# Patient Record
Sex: MALE | Race: BLACK OR AFRICAN AMERICAN | NOT HISPANIC OR LATINO | Employment: OTHER | URBAN - METROPOLITAN AREA
[De-identification: names, ages, dates, MRNs, and addresses within clinical notes are randomized per-mention and may not be internally consistent; named-entity substitution may affect disease eponyms.]

---

## 2019-12-02 ENCOUNTER — OFFICE VISIT (OUTPATIENT)
Dept: ALLERGY | Facility: CLINIC | Age: 59
End: 2019-12-02
Payer: MEDICARE

## 2019-12-02 VITALS — OXYGEN SATURATION: 98 % | WEIGHT: 180.75 LBS | HEIGHT: 62 IN | HEART RATE: 76 BPM | BODY MASS INDEX: 33.26 KG/M2

## 2019-12-02 DIAGNOSIS — J45.40 MODERATE PERSISTENT ASTHMA WITHOUT COMPLICATION: Primary | ICD-10-CM

## 2019-12-02 DIAGNOSIS — J31.0 CHRONIC RHINITIS: ICD-10-CM

## 2019-12-02 PROCEDURE — 99999 PR PBB SHADOW E&M-EST. PATIENT-LVL III: CPT | Mod: PBBFAC,,, | Performed by: ALLERGY & IMMUNOLOGY

## 2019-12-02 PROCEDURE — 99999 PR PBB SHADOW E&M-EST. PATIENT-LVL III: ICD-10-PCS | Mod: PBBFAC,,, | Performed by: ALLERGY & IMMUNOLOGY

## 2019-12-02 PROCEDURE — 99213 OFFICE O/P EST LOW 20 MIN: CPT | Mod: PBBFAC,PO | Performed by: ALLERGY & IMMUNOLOGY

## 2019-12-02 PROCEDURE — 99204 PR OFFICE/OUTPT VISIT, NEW, LEVL IV, 45-59 MIN: ICD-10-PCS | Mod: S$PBB,,, | Performed by: ALLERGY & IMMUNOLOGY

## 2019-12-02 PROCEDURE — 99204 OFFICE O/P NEW MOD 45 MIN: CPT | Mod: S$PBB,,, | Performed by: ALLERGY & IMMUNOLOGY

## 2019-12-02 RX ORDER — ALBUTEROL SULFATE 90 UG/1
2 AEROSOL, METERED RESPIRATORY (INHALATION) EVERY 6 HOURS PRN
COMMUNITY
End: 2019-12-02 | Stop reason: SDUPTHER

## 2019-12-02 RX ORDER — PREDNISONE 20 MG/1
20 TABLET ORAL DAILY
Status: ON HOLD | COMMUNITY
End: 2020-11-25 | Stop reason: HOSPADM

## 2019-12-02 RX ORDER — FUROSEMIDE 80 MG/1
80 TABLET ORAL DAILY PRN
COMMUNITY

## 2019-12-02 RX ORDER — BUDESONIDE AND FORMOTEROL FUMARATE DIHYDRATE 160; 4.5 UG/1; UG/1
2 AEROSOL RESPIRATORY (INHALATION) EVERY 12 HOURS
Qty: 3 INHALER | Refills: 4 | Status: SHIPPED | OUTPATIENT
Start: 2019-12-02 | End: 2020-11-05 | Stop reason: CLARIF

## 2019-12-02 RX ORDER — BUDESONIDE AND FORMOTEROL FUMARATE DIHYDRATE 160; 4.5 UG/1; UG/1
2 AEROSOL RESPIRATORY (INHALATION) EVERY 12 HOURS
COMMUNITY
End: 2019-12-02 | Stop reason: SDUPTHER

## 2019-12-02 RX ORDER — GABAPENTIN 300 MG/1
300 CAPSULE ORAL 3 TIMES DAILY
COMMUNITY
End: 2024-03-26 | Stop reason: CLARIF

## 2019-12-02 RX ORDER — ALBUTEROL SULFATE 90 UG/1
2 AEROSOL, METERED RESPIRATORY (INHALATION) EVERY 6 HOURS PRN
Qty: 18 G | Refills: 3 | Status: SHIPPED | OUTPATIENT
Start: 2019-12-02 | End: 2021-01-12

## 2019-12-02 RX ORDER — FLUTICASONE PROPIONATE 50 MCG
2 SPRAY, SUSPENSION (ML) NASAL DAILY
Qty: 48 G | Refills: 4 | Status: SHIPPED | OUTPATIENT
Start: 2019-12-02 | End: 2020-12-14

## 2019-12-02 RX ORDER — AZELASTINE 1 MG/ML
2 SPRAY, METERED NASAL 2 TIMES DAILY
Qty: 90 ML | Refills: 4 | Status: SHIPPED | OUTPATIENT
Start: 2019-12-02 | End: 2020-12-14

## 2019-12-02 RX ORDER — ALLOPURINOL 300 MG/1
300 TABLET ORAL DAILY
COMMUNITY
End: 2024-03-26 | Stop reason: CLARIF

## 2019-12-02 RX ORDER — FLUTICASONE PROPIONATE 50 UG/1
POWDER, METERED RESPIRATORY (INHALATION)
COMMUNITY
End: 2019-12-02

## 2019-12-02 RX ORDER — AMLODIPINE BESYLATE 5 MG/1
10 TABLET ORAL DAILY
COMMUNITY

## 2019-12-02 NOTE — LETTER
December 2, 2019      Sushma Gupta, MD  [address] [facility]  [address]  [facility] [address]           [facility]  [address] [place] [address]  Phone: [phone]          Patient: Cecilio Skelton   MR Number: [id number]   YOB: 1960   Date of Visit: 12/2/2019       Dear Dr. Sushma Gupta:    Thank you for referring Cecilio Skelton to me for evaluation. Attached you will find relevant portions of my assessment and plan of care.    If you have questions, please do not hesitate to call me. I look forward to following Cecilio Skelton along with you.    Sincerely,    Leora Richard MD    Enclosure  CC:  No Recipients    If you would like to receive this communication electronically, please contact [email or web address] or [phone] to request more information on [facility] Link access.    For providers and/or their staff who would like to refer a patient to [facility], please contact us through our one-stop-shop provider referral line, [facility], at [phone].    If you feel you have received this communication in error or would no longer like to receive these types of communications, please e-mail [email or web address]

## 2019-12-02 NOTE — PROGRESS NOTES
Subjective:       Patient ID: Cecilio Skelton is a 59 y.o. male.    Chief Complaint:  Other (est care.former Dr Ramirez patient)      60 yo man presents for new patent evaluation of asthma. He has seen Dr Ramirez but establishing here since her residential. He has had asthma for years. He is on Symbicort 160 2 puffs BID. He uses ventolin usually once a day as  Habit and more if wheezing. Never more then 2-3 times per day. Ion last Y ear no bad flares, no ER or UC visits. He takes prednisone 20 mg daily for nephrotic syndrome. No increases in prednisone this last year because of asthma. He has some mucus he coughs up at times. He is on Flonase 2 SEN daily but nothing else. Rare SOB. occ nasal congestion and PND but not much sneeze or runny nose, had allergy test in 90's and told cockroach and maybe cats. Also told allergy to shellfish but never had reaction. It does flare his gout so avoids now. No eczema. No sinus surgery.       Environmental History: see history section for home environment  Review of Systems   Constitutional: Negative for activity change, appetite change, chills, fatigue, fever and unexpected weight change.   HENT: Positive for congestion and postnasal drip. Negative for ear discharge, ear pain, facial swelling, hearing loss, mouth sores, nosebleeds, rhinorrhea, sinus pressure, sneezing, sore throat, tinnitus, trouble swallowing and voice change.    Eyes: Negative for discharge, redness, itching and visual disturbance.   Respiratory: Positive for cough and wheezing. Negative for chest tightness and shortness of breath.    Cardiovascular: Negative for chest pain, palpitations and leg swelling.   Gastrointestinal: Negative for abdominal distention, abdominal pain, constipation, diarrhea, nausea and vomiting.   Genitourinary: Negative for difficulty urinating.   Musculoskeletal: Positive for arthralgias. Negative for back pain, joint swelling and myalgias.   Skin: Negative for color change, pallor and rash.    Neurological: Negative for dizziness, tremors, speech difficulty, weakness, light-headedness and headaches.   Hematological: Negative for adenopathy. Does not bruise/bleed easily.   Psychiatric/Behavioral: Negative for agitation, confusion, decreased concentration and sleep disturbance. The patient is not nervous/anxious.         Objective:      Physical Exam   Constitutional: He is oriented to person, place, and time. He appears well-developed and well-nourished. No distress.   HENT:   Head: Normocephalic and atraumatic.   Right Ear: Hearing, tympanic membrane, external ear and ear canal normal.   Left Ear: Hearing, tympanic membrane, external ear and ear canal normal.   Nose: No mucosal edema (pink turbinates), rhinorrhea, sinus tenderness or septal deviation. No epistaxis.   Mouth/Throat: Oropharynx is clear and moist and mucous membranes are normal. No uvula swelling.   Eyes: Conjunctivae are normal. Right eye exhibits no discharge. Left eye exhibits no discharge.   Neck: Normal range of motion. No thyromegaly present.   Cardiovascular: Normal rate, regular rhythm and normal heart sounds.   No murmur heard.  Pulmonary/Chest: Effort normal and breath sounds normal. No respiratory distress. He has no wheezes.   Abdominal: Soft. He exhibits no distension. There is no tenderness.   Musculoskeletal: Normal range of motion. He exhibits no edema.   Lymphadenopathy:     He has no cervical adenopathy.   Neurological: He is alert and oriented to person, place, and time. Coordination normal.   Skin: Skin is warm and dry. No rash noted. No erythema.   Psychiatric: He has a normal mood and affect. His behavior is normal. Judgment and thought content normal.   Nursing note and vitals reviewed.      Laboratory:   none performed   Assessment:       1. Moderate persistent asthma without complication    2. Chronic rhinitis         Plan:       1. Continue Symbicort 60 2 puffs BID  2. continue ventolin 2 puffs every 4 hours as  needed  3. continue fluticasone 2 SEN daily and add azelastine 2 SEN BID as needed for mucus  4. RTC annually or sooner if needed

## 2020-11-10 PROBLEM — C61 MALIGNANT NEOPLASM OF PROSTATE: Status: ACTIVE | Noted: 2020-11-10

## 2020-11-16 PROBLEM — I10 HYPERTENSION: Status: ACTIVE | Noted: 2020-11-16

## 2020-11-16 PROBLEM — J12.82 PNEUMONIA DUE TO COVID-19 VIRUS: Status: ACTIVE | Noted: 2020-11-16

## 2020-11-16 PROBLEM — J45.909 ASTHMA: Status: ACTIVE | Noted: 2020-11-16

## 2020-11-16 PROBLEM — A41.9 SEPSIS: Status: ACTIVE | Noted: 2020-11-16

## 2020-11-16 PROBLEM — M10.9 GOUT: Status: ACTIVE | Noted: 2020-11-16

## 2020-11-16 PROBLEM — U07.1 PNEUMONIA DUE TO COVID-19 VIRUS: Status: ACTIVE | Noted: 2020-11-16

## 2020-11-16 PROBLEM — J96.01 ACUTE RESPIRATORY FAILURE WITH HYPOXIA: Status: ACTIVE | Noted: 2020-11-16

## 2020-12-08 PROBLEM — N04.9 NEPHROTIC SYNDROME: Status: ACTIVE | Noted: 2020-12-08

## 2021-01-12 RX ORDER — ALBUTEROL SULFATE 90 UG/1
AEROSOL, METERED RESPIRATORY (INHALATION)
Qty: 18 G | Refills: 0 | Status: SHIPPED | OUTPATIENT
Start: 2021-01-12

## 2021-01-12 RX ORDER — AZELASTINE 1 MG/ML
SPRAY, METERED NASAL
Qty: 30 ML | Refills: 0 | Status: SHIPPED | OUTPATIENT
Start: 2021-01-12

## 2021-01-12 RX ORDER — FLUTICASONE PROPIONATE 50 MCG
SPRAY, SUSPENSION (ML) NASAL
Qty: 16 G | Refills: 0 | Status: SHIPPED | OUTPATIENT
Start: 2021-01-12

## 2021-03-08 RX ORDER — ALBUTEROL SULFATE 90 UG/1
AEROSOL, METERED RESPIRATORY (INHALATION)
Qty: 18 G | Refills: 0 | OUTPATIENT
Start: 2021-03-08

## 2021-03-08 RX ORDER — FLUTICASONE PROPIONATE 50 MCG
SPRAY, SUSPENSION (ML) NASAL
Qty: 16 G | Refills: 0 | OUTPATIENT
Start: 2021-03-08

## 2021-03-08 RX ORDER — AZELASTINE 1 MG/ML
SPRAY, METERED NASAL
Qty: 30 ML | Refills: 0 | OUTPATIENT
Start: 2021-03-08

## 2022-02-01 PROBLEM — S66.196D: Status: ACTIVE | Noted: 2022-02-01

## 2022-03-23 PROBLEM — S66.196D: Status: RESOLVED | Noted: 2022-02-01 | Resolved: 2022-03-23

## 2022-06-21 PROBLEM — G56.01 CARPAL TUNNEL SYNDROME ON RIGHT: Status: ACTIVE | Noted: 2022-06-21

## 2022-06-21 PROBLEM — G56.21 LESION OF RIGHT ULNAR NERVE: Status: ACTIVE | Noted: 2022-06-21

## 2022-06-21 PROBLEM — S56.127D: Status: ACTIVE | Noted: 2022-06-21

## 2022-06-21 PROBLEM — S56.125S: Status: ACTIVE | Noted: 2022-06-21

## 2023-02-08 ENCOUNTER — TELEPHONE (OUTPATIENT)
Dept: PAIN MEDICINE | Facility: CLINIC | Age: 63
End: 2023-02-08
Payer: MEDICARE

## 2023-02-08 NOTE — TELEPHONE ENCOUNTER
Offered pt Dr Renae 03/16 next available, he declined offered Dr Saab next available 03/02 pt didn't want to take that either but I told him that was the soonest advised we are interventional pain. Scheduled for 03/02

## 2023-02-08 NOTE — TELEPHONE ENCOUNTER
----- Message from Gin Davison sent at 2/8/2023 12:49 PM CST -----  Type:  Appointment Request    Name of Caller:Patient   When is the first available appointment?No access  Would the patient rather a call back or a response via [facility]? Call back   Best Call Back Number:[phone]  Additional Information: Patient indicates he would like to schedule an appointment with . patient indicates he is experiencing pains in his waist,thigh,groin and leg. Patient would like to schedule an appointment with  if possible. Patient would like a call back regarding mor information.

## 2023-02-13 PROBLEM — G56.21 LESION OF RIGHT ULNAR NERVE: Status: RESOLVED | Noted: 2022-06-21 | Resolved: 2023-02-13

## 2023-02-13 PROBLEM — S56.125S: Status: RESOLVED | Noted: 2022-06-21 | Resolved: 2023-02-13

## 2023-02-13 PROBLEM — G56.01 CARPAL TUNNEL SYNDROME ON RIGHT: Status: RESOLVED | Noted: 2022-06-21 | Resolved: 2023-02-13

## 2023-02-13 PROBLEM — S56.127D: Status: RESOLVED | Noted: 2022-06-21 | Resolved: 2023-02-13

## 2023-03-01 PROBLEM — M25.60 RANGE OF MOTION DEFICIT: Status: ACTIVE | Noted: 2023-03-01

## 2023-08-24 PROBLEM — M25.60 RANGE OF MOTION DEFICIT: Status: RESOLVED | Noted: 2023-03-01 | Resolved: 2023-08-24

## 2023-08-24 PROBLEM — G56.01 CARPAL TUNNEL SYNDROME ON RIGHT: Status: RESOLVED | Noted: 2022-06-21 | Resolved: 2023-08-24

## 2023-08-24 PROBLEM — S56.125S: Status: RESOLVED | Noted: 2022-06-21 | Resolved: 2023-08-24

## 2023-08-24 PROBLEM — S66.196D: Status: RESOLVED | Noted: 2022-02-01 | Resolved: 2023-08-24

## 2023-08-24 PROBLEM — G56.21 LESION OF RIGHT ULNAR NERVE: Status: RESOLVED | Noted: 2022-06-21 | Resolved: 2023-08-24

## 2024-02-01 DIAGNOSIS — R19.00 PALPABLE ABDOMINAL MASS: Primary | ICD-10-CM

## 2024-02-08 ENCOUNTER — HOSPITAL ENCOUNTER (OUTPATIENT)
Dept: RADIOLOGY | Facility: HOSPITAL | Age: 64
Discharge: HOME | End: 2024-02-08
Attending: NURSE PRACTITIONER
Payer: MEDICARE

## 2024-02-08 DIAGNOSIS — R19.00 PALPABLE ABDOMINAL MASS: ICD-10-CM

## 2024-02-08 PROCEDURE — 76705 ECHO EXAM OF ABDOMEN: CPT | Mod: TC,PO

## 2024-02-08 PROCEDURE — 76705 ECHO EXAM OF ABDOMEN: CPT | Mod: 26,,, | Performed by: RADIOLOGY

## 2024-03-29 PROBLEM — R19.00 ABDOMINAL MASS: Status: RESOLVED | Noted: 2024-03-29 | Resolved: 2024-03-29

## 2024-03-29 PROBLEM — R19.00 ABDOMINAL MASS: Status: ACTIVE | Noted: 2024-03-29

## 2024-08-20 PROBLEM — M25.641 STIFFNESS OF RIGHT HAND, NOT ELSEWHERE CLASSIFIED: Status: ACTIVE | Noted: 2024-08-20

## 2024-08-20 PROBLEM — B35.1 TINEA UNGUIUM: Status: ACTIVE | Noted: 2024-08-20

## 2024-08-22 PROBLEM — M79.645 PAIN IN FINGER OF BOTH HANDS: Status: ACTIVE | Noted: 2024-08-22

## 2024-08-22 PROBLEM — M79.644 PAIN IN FINGER OF BOTH HANDS: Status: ACTIVE | Noted: 2024-08-22

## 2024-08-28 PROBLEM — M54.16 LUMBAR RADICULOPATHY: Status: ACTIVE | Noted: 2024-08-28

## 2024-08-28 PROBLEM — M96.1 POSTLAMINECTOMY SYNDROME, CERVICAL REGION: Status: ACTIVE | Noted: 2024-08-28

## 2024-08-28 PROBLEM — M47.899 OTHER OSTEOARTHRITIS OF SPINE: Status: ACTIVE | Noted: 2024-08-28

## 2024-12-11 PROBLEM — M79.645 PAIN IN FINGER OF BOTH HANDS: Status: RESOLVED | Noted: 2024-08-22 | Resolved: 2024-12-11

## 2024-12-11 PROBLEM — M25.641 STIFFNESS OF RIGHT HAND, NOT ELSEWHERE CLASSIFIED: Status: RESOLVED | Noted: 2024-08-20 | Resolved: 2024-12-11

## 2024-12-11 PROBLEM — M79.644 PAIN IN FINGER OF BOTH HANDS: Status: RESOLVED | Noted: 2024-08-22 | Resolved: 2024-12-11

## 2024-12-18 PROBLEM — I82.409 DVT (DEEP VENOUS THROMBOSIS): Status: ACTIVE | Noted: 2024-12-18

## 2024-12-18 PROBLEM — I26.99 ACUTE PULMONARY EMBOLISM: Status: ACTIVE | Noted: 2024-12-18

## 2024-12-18 PROBLEM — R65.10 SIRS (SYSTEMIC INFLAMMATORY RESPONSE SYNDROME): Status: ACTIVE | Noted: 2024-12-18

## 2024-12-18 PROBLEM — J11.1 INFLUENZA: Status: ACTIVE | Noted: 2024-12-18

## 2024-12-18 PROBLEM — Z71.89 ACP (ADVANCE CARE PLANNING): Status: ACTIVE | Noted: 2024-12-18

## 2025-01-12 PROBLEM — Z86.711 HISTORY OF PULMONARY EMBOLUS (PE): Status: ACTIVE | Noted: 2025-01-12

## 2025-01-12 PROBLEM — R73.03 PREDIABETES: Status: ACTIVE | Noted: 2025-01-12

## 2025-01-12 PROBLEM — D64.9 NORMOCYTIC ANEMIA: Status: ACTIVE | Noted: 2025-01-12

## 2025-01-12 PROBLEM — I73.9 PERIPHERAL ARTERY DISEASE: Status: ACTIVE | Noted: 2025-01-12

## 2025-01-14 ENCOUNTER — TELEPHONE (OUTPATIENT)
Dept: VASCULAR SURGERY | Facility: CLINIC | Age: 65
End: 2025-01-14
Payer: MEDICARE

## 2025-01-14 DIAGNOSIS — I73.9 PERIPHERAL ARTERY DISEASE: Primary | ICD-10-CM

## 2025-01-14 NOTE — TELEPHONE ENCOUNTER
Appt scheduled for 1/30    ----- Message from Amanda sent at 1/14/2025 11:20 AM CST -----   Type:  Needs Medical Advice    Who Called:  Elpidio/[facility]    Would the patient rather a call back or a response via [facility]? call  Best Call Back Number:  [phone]        Additional Information:  trying to a hosptial f/u appt  Please call back to advise. Thank you!

## 2025-01-30 ENCOUNTER — OFFICE VISIT (OUTPATIENT)
Dept: VASCULAR SURGERY | Facility: CLINIC | Age: 65
End: 2025-01-30
Payer: MEDICARE

## 2025-01-30 VITALS
WEIGHT: 182.75 LBS | SYSTOLIC BLOOD PRESSURE: 112 MMHG | BODY MASS INDEX: 33.63 KG/M2 | HEART RATE: 88 BPM | HEIGHT: 62 IN | DIASTOLIC BLOOD PRESSURE: 76 MMHG

## 2025-01-30 DIAGNOSIS — I73.9 PERIPHERAL ARTERY DISEASE: Primary | ICD-10-CM

## 2025-01-30 PROCEDURE — 3044F HG A1C LEVEL LT 7.0%: CPT | Mod: CPTII,S$GLB,, | Performed by: STUDENT IN AN ORGANIZED HEALTH CARE EDUCATION/TRAINING PROGRAM

## 2025-01-30 PROCEDURE — 1125F AMNT PAIN NOTED PAIN PRSNT: CPT | Mod: CPTII,S$GLB,, | Performed by: STUDENT IN AN ORGANIZED HEALTH CARE EDUCATION/TRAINING PROGRAM

## 2025-01-30 PROCEDURE — 1101F PT FALLS ASSESS-DOCD LE1/YR: CPT | Mod: CPTII,S$GLB,, | Performed by: STUDENT IN AN ORGANIZED HEALTH CARE EDUCATION/TRAINING PROGRAM

## 2025-01-30 PROCEDURE — 3078F DIAST BP <80 MM HG: CPT | Mod: CPTII,S$GLB,, | Performed by: STUDENT IN AN ORGANIZED HEALTH CARE EDUCATION/TRAINING PROGRAM

## 2025-01-30 PROCEDURE — 99214 OFFICE O/P EST MOD 30 MIN: CPT | Mod: S$GLB,,, | Performed by: STUDENT IN AN ORGANIZED HEALTH CARE EDUCATION/TRAINING PROGRAM

## 2025-01-30 PROCEDURE — 3008F BODY MASS INDEX DOCD: CPT | Mod: CPTII,S$GLB,, | Performed by: STUDENT IN AN ORGANIZED HEALTH CARE EDUCATION/TRAINING PROGRAM

## 2025-01-30 PROCEDURE — 99999 PR PBB SHADOW E&M-EST. PATIENT-LVL II: CPT | Mod: PBBFAC,,, | Performed by: STUDENT IN AN ORGANIZED HEALTH CARE EDUCATION/TRAINING PROGRAM

## 2025-01-30 PROCEDURE — 3074F SYST BP LT 130 MM HG: CPT | Mod: CPTII,S$GLB,, | Performed by: STUDENT IN AN ORGANIZED HEALTH CARE EDUCATION/TRAINING PROGRAM

## 2025-01-30 PROCEDURE — 3288F FALL RISK ASSESSMENT DOCD: CPT | Mod: CPTII,S$GLB,, | Performed by: STUDENT IN AN ORGANIZED HEALTH CARE EDUCATION/TRAINING PROGRAM

## 2025-01-30 NOTE — PROGRESS NOTES
[facility]  [facility]  History & Physical    PATIENT NAME: Cecilio Skelton  MRN: [id number]  TODAY'S DATE: 01/30/2025    SUBJECTIVE:     Chief Complaint:   Chief Complaint   Patient presents with    Hospital Follow Up         History of Present Illness:  Cecilio Skelton is a 64 y.o. male presents with  a history of PAD, HTN, HLD, hepatic and renal cysts, nephrotic syndrome on prednisone, prior PE/ DVT on Eliquis, who presents  to ER 01/13/2025 with complaints of right foot and toe pain with numbness now status post right SFA stenting, popliteal artery angioplasty as well as right peroneal artery and posterior tibial artery balloon angioplasty.    Since the intervention he has had much improvement in nearly complete resolution of pain in his right lower extremity.  He reports that the color in his toe has completely improved.  He does have a slight wound on his right toe that has very slowly to healing.    He also has complaints of ongoing pain to his left lower extremity.  Pain is worse with ambulating.  He is only able to ambulate very limited distances before having pain to his left lower extremity that does resolve with rest.  He does report that the pain in his left lower extremity has been ongoing now for some time and does limit his ability to function and perform activities of daily living.      Review of patient's allergies indicates:   Allergen Reactions    Shellfish containing products      Other Reaction(s): Info Not Available       Past Medical History:   Diagnosis Date    Arthritis     Asthma     Back pain     Gout     History of pneumonia     History of prostate cancer     Hx of acute respiratory failure     Hx of sinus tachycardia     Hypertension     Nephrotic syndrome     Personal history of COVID-19     Prostate cancer 09/2020     Past Surgical History:   Procedure Laterality Date    ANGIOGRAPHY OF LOWER EXTREMITY  1/13/2025    Procedure: Rt. Leg angiogram;   Surgeon: Yayo Ingram MD;  Location: [facility] CATH;  Service: Vascular;;    APPENDECTOMY      BUNIONECTOMY Bilateral     COLONOSCOPY N/A 7/25/2024    Procedure: COLONOSCOPY;  Surgeon: Darion Veloz MD;  Location: [facility] ENDO;  Service: Endoscopy;  Laterality: N/A;    EPIDURAL STEROID INJECTION INTO LUMBAR SPINE      EXCISION OF MASS OF ABDOMEN N/A 3/29/2024    Procedure: EXCISION, MASS, ABDOMEN- intra muscular abd wall mass;  Surgeon: Eugenio Hammer MD;  Location: [facility] OR;  Service: General;  Laterality: N/A;    EXPLORATORY LAPAROTOMY      as child for kidney issue    IVUS (INTRAVASCULAR ULTRASOUND)  1/13/2025    Procedure: IVUS Rt. SFA;  Surgeon: Yayo Ingram MD;  Location: [facility] CATH;  Service: Vascular;;    PERCUTANEOUS TRANSLUMINAL ANGIOPLASTY  1/13/2025    Procedure: PTA / Stent Rt. SFA;  Surgeon: Yayo Ingram MD;  Location: [facility] CATH;  Service: Vascular;;    PERCUTANEOUS TRANSLUMINAL ANGIOPLASTY (PTA) OF PERIPHERAL VESSEL  1/13/2025    Procedure: PTA Rt. TPT artery;  Surgeon: Yayo Ingram MD;  Location: [facility] CATH;  Service: Vascular;;    PTA, ARTERY, PERIPHERAL  1/13/2025    Procedure: PTA Rt. Posterior Tibial artery;  Surgeon: Yayo Ingram MD;  Location: [facility] CATH;  Service: Vascular;;    ROBOT-ASSISTED LAPAROSCOPIC PROSTATECTOMY USING DA DAVION XI N/A 02/09/2021    Procedure: XI ROBOTIC PROSTATECTOMY;  Surgeon: Luis Felipe Hammer MD;  Location: [facility] OR;  Service: Urology;  Laterality: N/A;     Family History   Problem Relation Name Age of Onset    Diabetes Mother      Heart disease Mother      Early death Father          mva    Allergic rhinitis Neg Hx      Allergies Neg Hx      Angioedema Neg Hx      Asthma Neg Hx      Atopy Neg Hx      Eczema Neg Hx      Rhinitis Neg Hx      Urticaria Neg Hx      Immunodeficiency Neg Hx       Social History     Tobacco Use    Smoking status: Never     Passive exposure: Never    Smokeless tobacco: Never   Substance Use Topics    Alcohol use:  Not Currently    Drug use: Never        Review of Systems:  Review of Systems   All other systems reviewed and are negative.      OBJECTIVE:     Vital Signs (Most Recent):  Pulse: 88 (01/30/25 0855)  BP: 112/76 (01/30/25 0855)      Physical Exam:  Physical Exam  Constitutional: Awake and oriented to person, place, and time. Appears well-developed and well-nourished. In no apparent distress.  HEENT: Normocephalic. Pupils normal and conjunctivae normal. Mucous membranes normal, no cyanosis or icterus, trachea central, no pallor or icterus is noted.  Neck: Normal range of motion. Neck supple. No JVD present. No bruit present.   Cardiovascular: Normal rate, regular rhythm and normal heart sounds. S1, S2. Exam reveals no gallop, clicks, or friction rub. No murmur noted.  Pulmonary/Chest: Effort normal and breath sounds clear to auscultation. No respiratory distress. No wheezes, rales, or coughing present.   Abdominal: Soft. Bowel sounds are normal. There is no tenderness. No rebound tenderness or guarding present. No abdomen pulsations, bruits, or masses noted.   Urinary: No nielson catheter present  Skin: Skin is warm and dry.  Extremities: No cyanosis, erythema, clubbing or edema noted at this time. No calf tenderness bilaterally.   Peripheral vascular system:  Nonpalpable pedal pulses      Laboratory:  Lab Results   Component Value Date    WBC 11.66 01/28/2025    HGB 14.0 01/28/2025    HCT 42.0 01/28/2025     01/28/2025    CHOL 168 01/28/2025    TRIG 178 (H) 01/28/2025    HDL 53 01/28/2025    ALT 16 01/28/2025    AST 17 01/28/2025     01/28/2025     01/28/2025    K 3.6 01/28/2025    K 3.6 01/28/2025     01/28/2025     01/28/2025    CREATININE 0.96 01/28/2025    CREATININE 0.96 01/28/2025    BUN 20 01/28/2025    BUN 20 01/28/2025    CO2 27 01/28/2025    CO2 28 01/28/2025    TSH 0.061 (L) 09/22/2022    PSA 3.3 12/04/2018    INR 1.1 01/12/2025    HGBA1C 6.4 (H) 01/12/2025         Diagnostic  Results:  Cardiac event monitor  - Predominant rhythm: NSR  - nonsustained Atrial Tachycardia (AT) 9 episodes, Longest 8 beats   - PAC 0.1 %  - PVC 0.3 %  1 patient triggered event which correlated with sinus tachycardia    Results for orders placed or performed during the hospital encounter of 12/17/24 (from the past 2160 hours)   CTA Chest Non-Coronary (PE Studies)    Narrative    EXAMINATION:  CTA CHEST NON CORONARY (PE STUDIES)    CLINICAL HISTORY:  Pulmonary embolism (PE) suspected, positive D-dimer    TECHNIQUE:  Axial images of the chest were obtained with IV contrast administration.  Coronal and sagittal reconstructions were provided.  Three dimensional and MIP images were obtained and evaluated.  Total DLP was 365 mGy-cm. Dose lowering technique and automated exposure control were utilized for this exam.    COMPARISON:  Chest radiograph 12/17/2024.    FINDINGS:  There are acute appearing filling defects in the bilateral lobar pulmonary arteries as well as the left upper lobe.  There is no CT evidence of right heart strain.    The airway is patent.  There is no mediastinal or hilar lymphadenopathy.  The heart is not enlarged.    There are peripheral wedge-shaped areas of opacity in the left lower lobe, lingula, right middle lobe and a poorly defined wedge-shaped area of ground-glass opacity in the right upper lobe.  There is no pleural effusion.  There is no pulmonary nodule or mass.  There is no airspace consolidation.    The upper abdomen demonstrates no acute abnormality.  There is no lytic or blastic osseous lesion.      Impression    1. Bilateral lower lobe lobar pulmonary emboli with no CT evidence of right heart strain.  2. Peripheral wedge-shaped areas of opacity which may reflect areas of pulmonary infarction.  [employer] concordance      Electronically signed by: Sony Gerardo MD  Date:    12/18/2024  Time:    07:53   Results for orders placed or performed during the hospital encounter of 11/13/24  (from the past 2160 hours)   CTA Runoff ABD Pel Bilat Lower EXT    Narrative    EXAMINATION:  CTA RUNOFF ABD PEL BILAT LOWER EXT    CLINICAL HISTORY:  Claudication or leg ischemia;Severe peripheral arterial disease based on ultrasound findings.  Need to define extent of disease and look for aortoiliac involvement.;  Peripheral vascular disease, unspecified    Total DLP: 303 mGy.cm    Automatic exposure control was utilized to reduce the patient's dose    TECHNIQUE:  CTA of abdomen, pelvis, and bilateral lower extremities performed with 100 mL Omnipaque 300 intravenous contrast.    COMPARISON:  none    FINDINGS:  Aorta: Normal caliber.  No dissection, penetrating ulcer, or intramural hematoma.  The mesenteric vessels appear widely patent.  Bilateral single renal arteries are identified.    Right:    Common iliac artery: Atherosclerotic calcifications identified in the common iliac artery without significant stenosis.    Internal iliac artery: Mild atherosclerotic calcification is seen but the vessel remains patent.    External iliac artery: Normal.    Common femoral artery: Normal.    Profundus femorals artery: Mild atherosclerotic changes are identified but the vessel is widely patent.    Femoral artery: Atherosclerotic changes are identified throughout the superficial femoral artery scattered areas of moderate atherosclerotic change are identified in the mid superficial femoral artery.  There appears to be focal occlusion in the mid superficial femoral artery in association with extensive atherosclerotic calcification.    Popliteal artery: Moderate atherosclerotic calcification is seen throughout the popliteal artery heavy calcification makes evaluation of the lumen difficult but there may be areas of moderate stenosis at the level of the knee joint.    Anterior tibial artery: Extensive atherosclerotic calcification is seen throughout the anterior tibial artery but the vessel appears likely patent    Posterior  tibial artery: The vessel appears patent proximally.  The mid and distal portions demonstrate extensive atherosclerotic calcification.    Peroneal artery: Normal.    The dorsalis pedis artery appears calcified.  Patency is difficult to evaluate.    Left:    Common iliac artery: Mild atherosclerotic calcification is seen without significant stenosis.    Internal iliac artery: Moderate atherosclerotic calcification is seen but the vessel remains patent.    External iliac artery: Normal.    Common femoral artery: Normal.    The profundus femorals artery: Mild atherosclerotic calcification is noted.  The vessel is otherwise unremarkable.    Femoral artery: Extensive atherosclerotic calcification is seen sub throughout the superficial femoral artery with likely areas of moderate atherosclerotic change along its mid and proximal portions.  In the distal portion, there appears to be focal area of high-grade stenosis versus short segmental occlusion.  This is just superior to the abductor canal.    Popliteal artery: Atherosclerotic calcification is seen throughout the popliteal artery without definite areas of high-grade stenosis.    Anterior tibial artery: Atherosclerotic calcification is seen throughout the vessel.  It appears patent proximally.  The distal portions are difficult to evaluate due to the extensive calcification and small size.    Posterior tibial artery: Normal.    Peroneal artery: Demonstrates moderate atherosclerotic calcification at its origin but the vessel appears otherwise patent.    Dorsalis pedis artery appears calcified.  Its patency cannot be evaluated.    Small hypodensity is identified in the liver which may represent a small cyst measuring 7 mm.  Bilateral renal cysts are identified.  The adrenal glands appear unremarkable.  The pancreas and spleen appear normal.  The gallbladder appears unremarkable.  No retroperitoneal adenopathy is noted.  The bowel appears unremarkable.  Moderate  circumferential thickening of the bladder is noted.  This may represent cystitis versus artifact from decompression.  Degenerative and postoperative changes are identified in the spine.    No acute osseous findings.      Impression    1. Atherosclerotic changes are identified in both lower extremities.  Moderate to high-grade stenosis are identified in the superficial femoral arteries along their mid portions.  There may be focal occlusion present on the right.  2. Lower extremity runoff is difficult to evaluate below the knees secondary to atherosclerotic calcification is detailed above.  3. Hepatic and renal cysts are seen.      Electronically signed by: Carlyle Alford  Date:    11/13/2024  Time:    13:41          ASSESSMENT/PLAN:     Patient with a known history of peripheral arterial disease with chronic limb-threatening ischemia of the right lower extremity and now progressive lifestyle limiting claudication to the left lower extremity.    In regards to his left lower extremity it is clear that their symptoms have limited their lifestyle in such a way that they have a hard time ambulating even short distances without severe pain.  It seems in his pain is nearing rest pain and limb-threatening ischemia.  Performing basic activities of daily living have become challenging and threatens their ability to maintain independence.  I have reviewed all imaging findings including the CTA which does illustrate left SFA disease as well as tibial disease.  I have personally discussed these findings with the patient as well as the etiology of their pathology.      I do not believe Physician supervised conservative management is not the best option for this patient because the severity of their lifestyle limiting symptoms is prohibitive to participation and success.  My recommendation is to perform an angiogram of the left lower extremity with plans to intervene on the lesions found on imaging to optimizes perfusion to the  extremity in order to relieve pain and reestablish some level of functional independence.  I discuss these recommendations with the patient as well as the risk and benefits with the patient and all their questions were answered.     Plan:  Plan for left leg angiogram w  In the meantime will also f/u US to see if perfusion is better to the right foot since he feels the wound is not getting better.   Continue aspirin and Plavix      Yayo Ingram M.D.   [facility]   Date of Service: 01/30/2025

## 2025-02-03 ENCOUNTER — TELEPHONE (OUTPATIENT)
Dept: VASCULAR SURGERY | Facility: CLINIC | Age: 65
End: 2025-02-03
Payer: MEDICARE

## 2025-02-03 DIAGNOSIS — I73.9 PERIPHERAL ARTERY DISEASE: Primary | ICD-10-CM

## 2025-02-03 DIAGNOSIS — I73.9 PAD (PERIPHERAL ARTERY DISEASE): ICD-10-CM

## 2025-02-03 RX ORDER — LIDOCAINE HYDROCHLORIDE 10 MG/ML
1 INJECTION, SOLUTION EPIDURAL; INFILTRATION; INTRACAUDAL; PERINEURAL ONCE
Status: CANCELLED | OUTPATIENT
Start: 2025-02-03 | End: 2025-02-03

## 2025-02-03 NOTE — TELEPHONE ENCOUNTER
Spoke w/ pt. Angio scheduled for 2/14. U/S rescheduled to 2/10. Directions given. Pt verbalized understanding.

## 2025-02-10 ENCOUNTER — HOSPITAL ENCOUNTER (OUTPATIENT)
Dept: CARDIOLOGY | Facility: HOSPITAL | Age: 65
Discharge: HOME | End: 2025-02-10
Attending: STUDENT IN AN ORGANIZED HEALTH CARE EDUCATION/TRAINING PROGRAM
Payer: MEDICARE

## 2025-02-10 DIAGNOSIS — I73.9 PERIPHERAL ARTERY DISEASE: ICD-10-CM

## 2025-02-10 LAB
RIGHT ANT TIBIAL SYS PSV: 17 CM/S
RIGHT CFA PSV: 64 CM/S
RIGHT PERONEAL SYS PSV: 35 CM/S
RIGHT POPLITEAL PSV: 91 CM/S
RIGHT POST TIBIAL SYS PSV: 96 CM/S
RIGHT PROFUNDA SYS PSV: 15 CM/S
RIGHT SUPER FEMORAL DIST SYS PSV: 117 CM/S
RIGHT SUPER FEMORAL MID SYS PSV: 163 CM/S
RIGHT SUPER FEMORAL OSTIAL SYS PSV: 62 CM/S
RIGHT SUPER FEMORAL PROX SYS PSV: 160 CM/S
RIGHT TIB/PER TRUNK SYS PSV: 117 CM/S

## 2025-02-10 PROCEDURE — 93926 LOWER EXTREMITY STUDY: CPT | Mod: PO,RT

## 2025-02-10 PROCEDURE — 93926 LOWER EXTREMITY STUDY: CPT | Mod: 26,RT,, | Performed by: INTERNAL MEDICINE

## 2025-02-17 ENCOUNTER — TELEPHONE (OUTPATIENT)
Dept: VASCULAR SURGERY | Facility: CLINIC | Age: 65
End: 2025-02-17
Payer: MEDICARE

## 2025-02-17 NOTE — TELEPHONE ENCOUNTER
Pt scheduled 2/27 at 11:30  ----- Message from Nicole sent at 2/17/2025  4:36 PM CST -----  Type: Appointment Request Caller is requesting appointment.   Name of Caller:Cecilio When is the first available appointment?na Symptoms: Would the patient rather a call back or a response via [facility]? call Best Call Back Number:[phone] Additional Information: Dr. Ingram did his angiogram on 2/14 and he only wants to see Dr Ingram to review the results. THanks     Please call Back to advise. Thanks!

## 2025-02-24 ENCOUNTER — TELEPHONE (OUTPATIENT)
Dept: VASCULAR SURGERY | Facility: CLINIC | Age: 65
End: 2025-02-24
Payer: MEDICARE

## 2025-02-24 NOTE — TELEPHONE ENCOUNTER
Spoke w/ pt. Appt scheduled for 2/27. Pt stated his leg is still swelling. Advised to elevate. He states he is still able to walk. Told pt to call clinic if pain is causing him unable to walk. Pt verbalized understanding.

## 2025-02-27 ENCOUNTER — OFFICE VISIT (OUTPATIENT)
Dept: VASCULAR SURGERY | Facility: CLINIC | Age: 65
End: 2025-02-27
Payer: MEDICARE

## 2025-02-27 VITALS
OXYGEN SATURATION: 98 % | HEART RATE: 80 BPM | BODY MASS INDEX: 34.56 KG/M2 | HEIGHT: 62 IN | DIASTOLIC BLOOD PRESSURE: 88 MMHG | SYSTOLIC BLOOD PRESSURE: 160 MMHG | WEIGHT: 187.81 LBS

## 2025-02-27 DIAGNOSIS — I73.9 PAD (PERIPHERAL ARTERY DISEASE): Primary | ICD-10-CM

## 2025-02-27 DIAGNOSIS — I73.9 PERIPHERAL ARTERY DISEASE: Primary | ICD-10-CM

## 2025-02-27 PROCEDURE — 99999 PR PBB SHADOW E&M-EST. PATIENT-LVL IV: CPT | Mod: PBBFAC,,, | Performed by: STUDENT IN AN ORGANIZED HEALTH CARE EDUCATION/TRAINING PROGRAM

## 2025-02-27 RX ORDER — LIDOCAINE HYDROCHLORIDE 10 MG/ML
1 INJECTION, SOLUTION EPIDURAL; INFILTRATION; INTRACAUDAL; PERINEURAL ONCE
Status: CANCELLED | OUTPATIENT
Start: 2025-02-27 | End: 2025-02-27

## 2025-02-27 NOTE — PROGRESS NOTES
[facility] Vascular  [facility]  History & Physical    PATIENT NAME: Cecilio Skelton  MRN: [id number]  TODAY'S DATE: 02/27/2025    SUBJECTIVE:     Chief Complaint:   Chief Complaint   Patient presents with    Wound Check         History of Present Illness:  Cecilio Skelton is a 64 y.o. male presents with  a history of PAD, HTN, HLD, hepatic and renal cysts, nephrotic syndrome on prednisone, prior PE/ DVT on Eliquis, who presents  to ER 01/13/2025 with complaints of right foot and toe pain with numbness now status post right SFA stenting, popliteal artery angioplasty as well as right peroneal artery and posterior tibial artery balloon angioplasty.     Since the intervention he has had much improvement in nearly complete resolution of pain in his right lower extremity.  He reports that the color in his toe has completely improved.  He does have a slight wound on his right toe that has very slowly to healing.     He also has complaints of ongoing pain to his left lower extremity.  Pain is worse with ambulating.  He is only able to ambulate very limited distances before having pain to his left lower extremity that does resolve with rest.  He does report that the pain in his left lower extremity has been ongoing now for some time and does limit his ability to function and perform activities of daily living.     Interval history 02/27/2025:  Now status post left lower extremity angiogram.  He is still has a wound on his right great toe.  Over the past week he has been complaining of rest pain to the right lower extremity with numbness and tingling to the right foot.  He is having to dangle his right leg over the bed while sleeping at night because of the pain      Review of patient's allergies indicates:   Allergen Reactions    Shellfish containing products      Other Reaction(s): Info Not Available       Past Medical History:   Diagnosis Date    Arthritis     Asthma     Back pain     Gout      History of pneumonia     History of prostate cancer     Hx of acute respiratory failure     Hx of sinus tachycardia     Hypertension     Nephrotic syndrome     Personal history of COVID-19     Prostate cancer 09/2020     Past Surgical History:   Procedure Laterality Date    ANGIOGRAPHY OF LOWER EXTREMITY  1/13/2025    Procedure: Rt. Leg angiogram;  Surgeon: Yayo Ingram MD;  Location: [facility];  Service: Vascular;;    ANGIOGRAPHY OF LOWER EXTREMITY  2/14/2025    Procedure: Abdominal w/Runoff;  Surgeon: Yayo Ingram MD;  Location: [facility] CATH;  Service: Vascular;;    APPENDECTOMY      BUNIONECTOMY Bilateral     COLONOSCOPY N/A 7/25/2024    Procedure: COLONOSCOPY;  Surgeon: Darion Veloz MD;  Location: [facility] ENDO;  Service: Endoscopy;  Laterality: N/A;    EPIDURAL STEROID INJECTION INTO LUMBAR SPINE      EXCISION OF MASS OF ABDOMEN N/A 3/29/2024    Procedure: EXCISION, MASS, ABDOMEN- intra muscular abd wall mass;  Surgeon: Eugenio Hammer MD;  Location: [facility] OR;  Service: General;  Laterality: N/A;    EXPLORATORY LAPAROTOMY      as child for kidney issue    IVUS (INTRAVASCULAR ULTRASOUND)  1/13/2025    Procedure: IVUS Rt. SFA;  Surgeon: Yayo Ingram MD;  Location: [facility] CATH;  Service: Vascular;;    PERCUTANEOUS TRANSLUMINAL ANGIOPLASTY  1/13/2025    Procedure: PTA / Stent Rt. SFA;  Surgeon: Yayo Ingram MD;  [facility];  Service: Vascular;;    PERCUTANEOUS TRANSLUMINAL ANGIOPLASTY (PTA) OF PERIPHERAL VESSEL  1/13/2025    Procedure: PTA Rt. TPT artery;  Surgeon: Yayo Ingram MD;  Location: [facility] CATH;  Service: Vascular;;    PTA, ARTERY, PERIPHERAL  1/13/2025    Procedure: PTA Rt. Posterior Tibial artery;  Surgeon: Yayo Ingarm MD;  Location: [facility] CATH;  Service: Vascular;;    PTA, FEMORAL ARTERY  2/14/2025    Procedure: PTA Lt. SFA;  Surgeon: Yayo Ingram MD;  Location: [facility] CATH;  Service: Vascular;;    PTA, POPLITEAL  2/14/2025    Procedure: PTA Lt. Popliteal  artery;  Surgeon: Yayo Ingram MD;  Location: [facility] CATH;  Service: Vascular;;    ROBOT-ASSISTED LAPAROSCOPIC PROSTATECTOMY USING DA DAVION XI N/A 02/09/2021    Procedure: XI ROBOTIC PROSTATECTOMY;  Surgeon: Luis Felipe Hammer MD;  Location: [facility] OR;  Service: Urology;  Laterality: N/A;     Family History   Problem Relation Name Age of Onset    Diabetes Mother      Heart disease Mother      Early death Father          mva    Allergic rhinitis Neg Hx      Allergies Neg Hx      Angioedema Neg Hx      Asthma Neg Hx      Atopy Neg Hx      Eczema Neg Hx      Rhinitis Neg Hx      Urticaria Neg Hx      Immunodeficiency Neg Hx       Social History[1]     Review of Systems:  ROS    OBJECTIVE:     Vital Signs (Most Recent):  Pulse: 80 (02/27/25 0909)  BP: (!) 160/88 (02/27/25 0909)  SpO2: 98 % (02/27/25 0909)      Physical Exam:  Physical Exam  Constitutional: Awake and oriented to person, place, and time. Appears well-developed and well-nourished. In no apparent distress.  HEENT: Normocephalic. Pupils normal and conjunctivae normal. Mucous membranes normal, no cyanosis or icterus, trachea central, no pallor or icterus is noted.  Neck: Normal range of motion. Neck supple. No JVD present. No bruit present.   Cardiovascular: Normal rate, regular rhythm and normal heart sounds. S1, S2. Exam reveals no gallop, clicks, or friction rub. No murmur noted.  Pulmonary/Chest: Effort normal and breath sounds clear to auscultation. No respiratory distress. No wheezes, rales, or coughing present.   Abdominal: Soft. Bowel sounds are normal. There is no tenderness. No rebound tenderness or guarding present. No abdomen pulsations, bruits, or masses noted.   Urinary: No nielson catheter present  Skin: Skin is warm and dry.  Extremities: No cyanosis, erythema, clubbing or edema noted at this time. No calf tenderness bilaterally.  Right great toe wound  Peripheral vascular system:  Nonpalpable pedal pulses on the right      Laboratory:  Lab  Results   Component Value Date    WBC 10.62 02/14/2025    HGB 13.0 (L) 02/14/2025    HCT 38.6 (L) 02/14/2025     02/14/2025    CHOL 168 01/28/2025    TRIG 178 (H) 01/28/2025    HDL 53 01/28/2025    ALT 16 01/28/2025    AST 17 01/28/2025     02/14/2025    K 3.5 02/14/2025     (H) 02/14/2025    CREATININE 1.06 02/14/2025    BUN 17 02/14/2025    CO2 24 02/14/2025    TSH 0.061 (L) 09/22/2022    PSA 3.3 12/04/2018    INR 1.1 01/12/2025    HGBA1C 6.4 (H) 01/12/2025         Diagnostic Results:  X-Ray Lumbar Spine AP And Lateral  Narrative: EXAMINATION:  XR LUMBAR SPINE AP AND LATERAL    CLINICAL HISTORY:  Radiculopathy, lumbar region    TECHNIQUE:  AP, lateral and spot images were performed of the lumbar spine.    COMPARISON:  03/18/2022    FINDINGS:  Surgical changes with prominent posterior and lateral fusion hardware L3-L4 through L5-S1.  Hardware demonstrates satisfactory position without evidence of loosening or instability.  Chronic anterior wedging lower thoracic upper lumbar levels.  Moderate narrowing L1-L2 and L2-L3 with mild osteophytic lipping.  Mild narrowing lower thoracic levels.  Alignment satisfactory.  Impression: Surgical fusion hardware L3 through S1 with satisfactory position without evidence of loosening or instability.  Spondylosis as above.  Interval surgical revision at the L3-L4 level compared to the prior.  There appears to be interval worsening of degenerative disc changes of the upper lumbar levels compared to the prior.    Electronically signed by: Yulisa De Leon MD  Date:    02/21/2025  Time:    10:21    Results for orders placed or performed during the hospital encounter of 12/17/24 (from the past 2160 hours)   CTA Chest Non-Coronary (PE Studies)    Narrative    EXAMINATION:  CTA CHEST NON CORONARY (PE STUDIES)    CLINICAL HISTORY:  Pulmonary embolism (PE) suspected, positive D-dimer    TECHNIQUE:  Axial images of the chest were obtained with IV contrast administration.   Coronal and sagittal reconstructions were provided.  Three dimensional and MIP images were obtained and evaluated.  Total DLP was 365 mGy-cm. Dose lowering technique and automated exposure control were utilized for this exam.    COMPARISON:  Chest radiograph 12/17/2024.    FINDINGS:  There are acute appearing filling defects in the bilateral lobar pulmonary arteries as well as the left upper lobe.  There is no CT evidence of right heart strain.    The airway is patent.  There is no mediastinal or hilar lymphadenopathy.  The heart is not enlarged.    There are peripheral wedge-shaped areas of opacity in the left lower lobe, lingula, right middle lobe and a poorly defined wedge-shaped area of ground-glass opacity in the right upper lobe.  There is no pleural effusion.  There is no pulmonary nodule or mass.  There is no airspace consolidation.    The upper abdomen demonstrates no acute abnormality.  There is no lytic or blastic osseous lesion.      Impression    1. Bilateral lower lobe lobar pulmonary emboli with no CT evidence of right heart strain.  2. Peripheral wedge-shaped areas of opacity which may reflect areas of pulmonary infarction.  [employer] concordance      Electronically signed by: Sony Gerardo MD  Date:    12/18/2024  Time:    07:53          ASSESSMENT/PLAN:     The patient is status post bilateral lower extremity angiogram.  We did place a stent in the right superficial femoral artery.    Over the past week he has been having progressive pain in his right lower extremity.  He now that has rest pain where he has a dangle his foot on the side of the bed to alleviate his pain.  I do have some concern that his right popliteal artery stent is now occluded and he has progressed to chronic limb-threatening ischemia.    We will plan to perform a right lower extremity angiogram tomorrow.  We will likely need some form of thrombectomy or thrombolysis.  We will then need to attempt to treat his tibial vessels since  he is still having persistent wound on his right great toe.  Risks and benefits were discussed with the patient in extensive detail.    His laboratory studies were reviewed    Plan:  Right lower extremity angiogram            Yayo Ingram M.D.   [facility]   Date of Service: 02/27/2025             [1]   Social History  Tobacco Use    Smoking status: Never     Passive exposure: Never    Smokeless tobacco: Never   Substance Use Topics    Alcohol use: Not Currently    Drug use: Never

## 2025-03-10 ENCOUNTER — TELEPHONE (OUTPATIENT)
Dept: VASCULAR SURGERY | Facility: CLINIC | Age: 65
End: 2025-03-10
Payer: MEDICARE

## 2025-03-10 NOTE — TELEPHONE ENCOUNTER
Spoke w/ pt. Appt scheduled. Pt verbalized understanding.        ----- Message from Ford sent at 3/10/2025  2:55 PM CDT -----  Contact: Pt [phone]  Type:  Sooner Apoointment RequestCaller is requesting a sooner appointment.  Caller declined first available appointment listed below.  Caller will not accept being placed on the waitlist and is requesting a message be sent to doctor.Name of Caller:PtWhen is the first available appointment?NASymptoms:F/UWould the patient rather a call back or a response via [facility]? [facility] Call Back Number:[phone] Additional Information: Pt is trying to get appt for follow up. Pls call back and adv. Thank you.

## 2025-03-25 PROBLEM — R94.39 ABNORMAL STRESS TEST: Status: ACTIVE | Noted: 2025-03-25

## 2025-04-03 ENCOUNTER — OFFICE VISIT (OUTPATIENT)
Facility: CLINIC | Age: 65
End: 2025-04-03
Payer: MEDICARE

## 2025-04-03 VITALS
SYSTOLIC BLOOD PRESSURE: 164 MMHG | DIASTOLIC BLOOD PRESSURE: 76 MMHG | HEIGHT: 62 IN | BODY MASS INDEX: 35.3 KG/M2 | WEIGHT: 191.81 LBS | HEART RATE: 73 BPM | OXYGEN SATURATION: 94 %

## 2025-04-03 DIAGNOSIS — I73.9 PERIPHERAL ARTERY DISEASE: Primary | ICD-10-CM

## 2025-04-03 PROCEDURE — 99999 PR PBB SHADOW E&M-EST. PATIENT-LVL III: CPT | Mod: PBBFAC,,, | Performed by: STUDENT IN AN ORGANIZED HEALTH CARE EDUCATION/TRAINING PROGRAM

## 2025-04-03 NOTE — PROGRESS NOTES
[facility] Vascular Surgery  [facility]  History & Physical    PATIENT NAME: Cecilio Skelton  MRN: [id number]  TODAY'S DATE: 04/03/2025    SUBJECTIVE:     Chief Complaint:   Chief Complaint   Patient presents with    Follow-up    Peripheral artery disease         History of Present Illness:  eCcilio Skelton is a 64 y.o. male presents with  a history of PAD, HTN, HLD, hepatic and renal cysts, nephrotic syndrome on prednisone, prior PE/ DVT on Eliquis, who presents  to ER 01/13/2025 with complaints of right foot and toe pain with numbness now status post right SFA stenting, popliteal artery angioplasty as well as right peroneal artery and posterior tibial artery balloon angioplasty.     Since the intervention he has had much improvement in nearly complete resolution of pain in his right lower extremity.  He reports that the color in his toe has completely improved.  He does have a slight wound on his right toe that has very slowly to healing.     He also has complaints of ongoing pain to his left lower extremity.  Pain is worse with ambulating.  He is only able to ambulate very limited distances before having pain to his left lower extremity that does resolve with rest.  He does report that the pain in his left lower extremity has been ongoing now for some time and does limit his ability to function and perform activities of daily living.     Interval history 02/27/2025:  Now status post left lower extremity angiogram.  He is still has a wound on his right great toe.  Over the past week he has been complaining of rest pain to the right lower extremity with numbness and tingling to the right foot.  He is having to dangle his right leg over the bed while sleeping at night because of the pain    Interval history 04/03/2025:  Patient is now status post a 2nd right lower extremity angiogram with intervention of the popliteal artery. Wound is healing. He is on his plavix and asa. Still has some neuropathy in  the right foot      Review of patient's allergies indicates:   Allergen Reactions    Shellfish containing products Other (See Comments)     Gout flare up       Past Medical History:   Diagnosis Date    Anticoagulant long-term use     Arthritis     Asthma     Back pain     Gout     History of pneumonia     History of prostate cancer     Hx of acute respiratory failure     Hx of sinus tachycardia     Hypertension     Nephrotic syndrome     Personal history of COVID-19     Prostate cancer 09/2020     Past Surgical History:   Procedure Laterality Date    ANGIOGRAPHY OF LOWER EXTREMITY  01/13/2025    Procedure: Rt. Leg angiogram;  Surgeon: Yayo Ingram MD;  Location: [facility] CATH;  Service: Vascular;;    ANGIOGRAPHY OF LOWER EXTREMITY  02/14/2025    Procedure: Abdominal w/Runoff;  Surgeon: Yaoy Ingram MD;  Location: [facility] CATH;  Service: Vascular;;    ANGIOGRAPHY OF LOWER EXTREMITY  02/28/2025    Procedure: Rt. Leg angiogram;  Surgeon: Yayo Ingram MD;  Location: [facility] CATH;  Service: Vascular;;    APPENDECTOMY      BACK SURGERY      BUNIONECTOMY Bilateral     COLONOSCOPY N/A 07/25/2024    Procedure: COLONOSCOPY;  Surgeon: Darion Veloz MD;  Location: [facility] ENDO;  Service: Endoscopy;  Laterality: N/A;    EPIDURAL STEROID INJECTION INTO LUMBAR SPINE      EXCISION OF MASS OF ABDOMEN N/A 03/29/2024    Procedure: EXCISION, MASS, ABDOMEN- intra muscular abd wall mass;  Surgeon: Eugenio Hammer MD;  Location: [facility] OR;  Service: General;  Laterality: N/A;    EXPLORATORY LAPAROTOMY      as child for kidney issue    FRACTIONAL FLOW RESERVE (FFR), CORONARY  3/25/2025    Procedure: (FFR) LAD;  Surgeon: Ilya Mckeon MD;  Location: [facility] CATH;  Service: Cardiovascular;;    IVUS (INTRAVASCULAR ULTRASOUND)  01/13/2025    Procedure: IVUS Rt. SFA;  Surgeon: Yayo Ingram MD;  Location: [facility];  Service: Vascular;;    LEFT HEART CATHETERIZATION  3/25/2025    Procedure: Left heart cath;  Surgeon:  Ilya Mckeon MD;  Location: [facility] CATH;  Service: Cardiovascular;;    PERCUTANEOUS TRANSLUMINAL ANGIOPLASTY  01/13/2025    Procedure: PTA / Stent Rt. SFA;  Surgeon: Yayo Ingram MD;  Location: [facility] CATH;  Service: Vascular;;    PERCUTANEOUS TRANSLUMINAL ANGIOPLASTY (PTA) OF PERIPHERAL VESSEL  01/13/2025    Procedure: PTA Rt. TPT artery;  Surgeon: Yayo Ingram MD;  Location: [facility] CATH;  Service: Vascular;;    PTA, ARTERY, PERIPHERAL  01/13/2025    Procedure: PTA Rt. Posterior Tibial artery;  Surgeon: Yayo Ingram MD;  Location: [facility] CATH;  Service: Vascular;;    PTA, FEMORAL ARTERY  02/14/2025    Procedure: PTA Lt. SFA;  Surgeon: Yayo Ingram MD;  Location: [facility] CATH;  Service: Vascular;;    PTA, POPLITEAL  02/14/2025    Procedure: PTA Lt. Popliteal artery;  Surgeon: Yayo Ingram MD;  Location: [facility] CATH;  Service: Vascular;;    PTA, POPLITEAL  02/28/2025    Procedure: PTA / Atherectomy Rt. Popliteal artery;  Surgeon: Yayo Ingram MD;  Location: [facility] CATH;  Service: Vascular;;    right leg stent x1      ROBOT-ASSISTED LAPAROSCOPIC PROSTATECTOMY USING DA DAVION XI N/A 02/09/2021    Procedure: XI ROBOTIC PROSTATECTOMY;  Surgeon: Luis Felipe Hammer MD;  Location: [facility] OR;  Service: Urology;  Laterality: N/A;     Family History   Problem Relation Name Age of Onset    Diabetes Mother      Heart disease Mother      Early death Father          mva    Allergic rhinitis Neg Hx      Allergies Neg Hx      Angioedema Neg Hx      Asthma Neg Hx      Atopy Neg Hx      Eczema Neg Hx      Rhinitis Neg Hx      Urticaria Neg Hx      Immunodeficiency Neg Hx       Social History[1]     Review of Systems:  ROS    OBJECTIVE:     Vital Signs (Most Recent):  Pulse: 73 (04/03/25 0859)  BP: (!) 164/76 (04/03/25 0859)  SpO2: (!) 94 % (04/03/25 0859)      Physical Exam:  Physical Exam  Constitutional: Awake and oriented to person, place, and time. Appears well-developed and well-nourished. In no  apparent distress.  HEENT: Normocephalic. Pupils normal and conjunctivae normal. Mucous membranes normal, no cyanosis or icterus, trachea central, no pallor or icterus is noted.  Neck: Normal range of motion. Neck supple. No JVD present. No bruit present.   Cardiovascular: Normal rate, regular rhythm and normal heart sounds. S1, S2. Exam reveals no gallop, clicks, or friction rub. No murmur noted.  Pulmonary/Chest: Effort normal and breath sounds clear to auscultation. No respiratory distress. No wheezes, rales, or coughing present.   Abdominal: Soft. Bowel sounds are normal. There is no tenderness. No rebound tenderness or guarding present. No abdomen pulsations, bruits, or masses noted.   Urinary: No nielson catheter present  Skin: Skin is warm and dry.  Extremities: No cyanosis, erythema, clubbing or edema noted at this time. No calf tenderness bilaterally.   Peripheral vascular system: Good palpable distal pulses.       Laboratory:  Lab Results   Component Value Date    WBC 13.01 (H) 03/31/2025    HGB 12.5 (L) 03/31/2025    HCT 38.2 (L) 03/31/2025     03/31/2025    CHOL 168 01/28/2025    TRIG 178 (H) 01/28/2025    HDL 53 01/28/2025    ALT 16 01/28/2025    AST 17 01/28/2025     03/31/2025    K 4.6 03/31/2025     03/31/2025    CREATININE 0.80 03/31/2025    BUN 18 03/31/2025    CO2 24 03/31/2025    TSH 0.061 (L) 09/22/2022    PSA 3.3 12/04/2018    INR 1.1 01/12/2025    HGBA1C 6.4 (H) 01/12/2025         Diagnostic Results:  Cardiac catheterization    The estimated blood loss was none.    The procedure log was documented by No documenter listed and verified by   Ilya Montalvo MD.    Procedure performed     .  Coronary angiography.    .  Left heart catheterization pressure pullback measurement.    .  FFR of the left anterior descending artery 0.81 hence insignificant  .  Moderate sedation performance and monitoring under my direct   supervision     Indication     .  Abnormal stress test.       Coronary anatomy     .  Left main is a large-sized vessel with no significant disease   bifurcates into LAD circumflex artery and ramus intermedius.    .  Lad is a large-sized vessel that has ostial 30-50% stenosis, that has   another 50% stenosis hazy looking eccentric in the mid LAD otherwise the   LAD has no significant disease.    .  Diagonal 1 which is almost a ramus intermedius vessel is medium   multibranching vessel, has mild luminal irregularities but no significant   disease.    .  That has a mildly branch coming up of the 1st diagonal vessel that has   small to medium vessel and has a 90% stenosis  .  Left circumflex artery is a medium-sized vessel terminating on a large   left posterolateral branch, that has a 50-70% stenosis in the proximal   circumflex artery.    .  RCA is a large dominant vessel, significantly tortuous throughout, has   mild luminal irregularities however no high-grade stenosis, distally gives   rise to a large RPDA and medium-sized posterior ventricular branch, PDA is   relatively small but severely diffusely diseased in the posterior   ventricular branch is relatively small with mild diffuse disease.  RPL 1   is small with no significant disease    LVEDP measured at 17 mm of mercury, no gradient across aortic valve     Discussion    Due to the borderline lesion in the ostium of the LAD and the mid LAD, I   elected to proceed with the FFR due to significant calcification   especially in the mid segment,, FFR of the LAD using adenosine at 140   mcg/kg per minute came at 0.81 hence nonsignificant  At this point in time catheters and wires were removed and the patient   left the lab in stable condition     Impression     .  Nonobstructive disease involving the ostial and mid LAD    Recommendation    .  Medical therapy as dictated by Dr. Foss    Date: 3/25/2025  Time: 9:12 AM    No results found for this or any previous visit (from the past 2160 hours).       ASSESSMENT/PLAN:      64-year-old male who presents with peripheral arterial disease in his right great toe wound.    His right toe wound has now healed.  He does have some tingling in his toes but I suspect his from his peripheral neuropathy.    He does have intervention that we did on both lower extremities so we will plan for a bilateral lower arterial ultrasound for surveillance.    Continue aspirin and Plavix        Yayo Ingram M.D.   [facility]   Date of Service: 04/03/2025             [1]   Social History  Tobacco Use    Smoking status: Never     Passive exposure: Never    Smokeless tobacco: Never   Substance Use Topics    Alcohol use: Not Currently    Drug use: Never

## 2025-04-11 ENCOUNTER — HOSPITAL ENCOUNTER (OUTPATIENT)
Dept: RADIOLOGY | Facility: HOSPITAL | Age: 65
Discharge: HOME | End: 2025-04-11
Attending: STUDENT IN AN ORGANIZED HEALTH CARE EDUCATION/TRAINING PROGRAM
Payer: MEDICARE

## 2025-04-11 DIAGNOSIS — I73.9 PERIPHERAL ARTERY DISEASE: ICD-10-CM

## 2025-04-11 PROCEDURE — 93922 UPR/L XTREMITY ART 2 LEVELS: CPT | Mod: 26,,, | Performed by: RADIOLOGY

## 2025-04-11 PROCEDURE — 93925 LOWER EXTREMITY STUDY: CPT | Mod: 26,,, | Performed by: RADIOLOGY

## 2025-04-11 PROCEDURE — 93922 UPR/L XTREMITY ART 2 LEVELS: CPT | Mod: TC,PO

## 2025-05-16 ENCOUNTER — RESULTS FOLLOW-UP (OUTPATIENT)
Facility: CLINIC | Age: 65
End: 2025-05-16

## 2025-05-16 DIAGNOSIS — I73.9 PAD (PERIPHERAL ARTERY DISEASE): Primary | ICD-10-CM
